# Patient Record
Sex: FEMALE | Race: WHITE | Employment: FULL TIME | URBAN - METROPOLITAN AREA
[De-identification: names, ages, dates, MRNs, and addresses within clinical notes are randomized per-mention and may not be internally consistent; named-entity substitution may affect disease eponyms.]

---

## 2017-03-09 ENCOUNTER — HOSPITAL ENCOUNTER (EMERGENCY)
Age: 57
Discharge: HOME | End: 2017-03-10
Attending: EMERGENCY MEDICINE
Payer: COMMERCIAL

## 2017-03-09 VITALS
HEART RATE: 89 BPM | SYSTOLIC BLOOD PRESSURE: 150 MMHG | OXYGEN SATURATION: 98 % | BODY MASS INDEX: 35.43 KG/M2 | RESPIRATION RATE: 16 BRPM | DIASTOLIC BLOOD PRESSURE: 96 MMHG | TEMPERATURE: 97.8 F | WEIGHT: 200 LBS

## 2017-03-09 DIAGNOSIS — S01.511A LIP LACERATION, INITIAL ENCOUNTER: Primary | ICD-10-CM
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
## 2017-03-09 PROCEDURE — 77030010507 HC ADH SKN DERMBND J&J -B

## 2017-03-09 PROCEDURE — 99282 EMERGENCY DEPT VISIT SF MDM: CPT

## 2017-03-09 PROCEDURE — 75810000293 HC SIMP/SUPERF WND  RPR

## 2017-03-10 NOTE — ED TRIAGE NOTES
Pt states she tripped over her dog and fell hitting her face on the nightstand. Denies LOC, small lac to area above upper lip. Bleeding controlled.

## 2017-03-10 NOTE — DISCHARGE INSTRUCTIONS
ArticleAlleyharEditorially Activation    Thank you for requesting access to [email or web address]. Please follow the instructions below to securely access and download your online medical record. [employer] allows you to send messages to your doctor, view your test results, renew your prescriptions, schedule appointments, and more. How Do I Sign Up? 1. In your internet browser, go to [email or web address]  2. Click on the First Time User? Click Here link in the Sign In box. You will be redirect to the New Member Sign Up page. 3. Enter your [employer] Access Code exactly as it appears below. You will not need to use this code after youve completed the sign-up process. If you do not sign up before the expiration date, you must request a new code. [email or web address] Access Code: Activation code not generated  Current [email or web address] Status: Active (This is the date your [employer] access code will )    4. Enter the last four digits of your Social Security Number (xxxx) and Date of Birth (mm/dd/yyyy) as indicated and click Submit. You will be taken to the next sign-up page. 5. Create a [employer] ID. This will be your [employer] login ID and cannot be changed, so think of one that is secure and easy to remember. 6. Create a [employer] password. You can change your password at any time. 7. Enter your Password Reset Question and Answer. This can be used at a later time if you forget your password. 8. Enter your e-mail address. You will receive e-mail notification when new information is available in [address] 9. Click Sign Up. You can now view and download portions of your medical record. 10. Click the Download Summary menu link to download a portable copy of your medical information. Additional Information    If you have questions, please visit the Frequently Asked Questions section of the [email or web address] website at [email or web address] Remember, [email or web address] is NOT to be used for urgent needs. For medical emergencies, dial 911.

## 2017-07-06 NOTE — ED PROVIDER NOTES
HPI Comments: Roslyn Sheridan is a 55year old female who presents with a laceration to the upper lip and maxillary face pain. Pt states she tripped over her dog and hit her face against the furniture in her house. No LOC. Patient is a 55 y.o. female presenting with skin problem. The history is provided by the patient. History limited by: no communication barrier. Skin Problem    This is a new problem. The current episode started 1 to 2 hours ago. The problem has not changed since onset. There has been no fever. Affected Location: upper lip. The pain is mild. The pain has been constant since onset. She has tried nothing for the symptoms. Past Medical History:   Diagnosis Date    Sarcoidosis ([facility] [address] 1990    Unspecified sleep apnea     Uterine polyp        Past Surgical History:   Procedure Laterality Date    HX HEENT      tonsillectomy    HX TONSILLECTOMY           Family History:   Problem Relation Age of Onset    Hypertension Mother     Cancer Father        Social History     Social History    Marital status:      Spouse name: N/A    Number of children: N/A    Years of education: N/A     Occupational History    Not on file. Social History Main Topics    Smoking status: Current Every Day Smoker     Packs/day: 0.25     Years: 30.00    Smokeless tobacco: Never Used    Alcohol use Yes    Drug use: No    Sexual activity: Yes     Partners: Male     Birth control/ protection: None     Other Topics Concern    Not on file     Social History Narrative         ALLERGIES: Vicodin [hydrocodone-acetaminophen]    Review of Systems   Constitutional: Negative. HENT: Negative. Eyes: Negative. Respiratory: Negative. Cardiovascular: Negative. Gastrointestinal: Negative. Endocrine: Negative. Genitourinary: Negative. Musculoskeletal: Negative. Skin: Positive for wound (upper lip and fce). Allergic/Immunologic: Negative. Neurological: Negative.     Hematological: Negative. Psychiatric/Behavioral: Negative. Vitals:    03/09/17 2244   BP: (!) 150/96   Pulse: 89   Resp: 16   Temp: 97.8 °F (36.6 °C)   SpO2: 98%   Weight: 90.7 kg (200 lb)            Physical Exam   Constitutional: She is oriented to person, place, and time. She appears well-developed and well-nourished. No distress. HENT:   Head: Normocephalic and atraumatic. The dentition is in tact without evidence of traumas. No loose  teeth. Eyes: EOM are normal. Pupils are equal, round, and reactive to light. Neck: Normal range of motion. Neck supple. Cardiovascular: Normal rate, regular rhythm and normal heart sounds. Pulmonary/Chest: No respiratory distress. She has no wheezes. She has no rales. Abdominal: Soft. Bowel sounds are normal. There is no tenderness. Genitourinary:   Genitourinary Comments: NE   Musculoskeletal: Normal range of motion. Neurological: She is alert and oriented to person, place, and time. Skin: Skin is warm and dry. Hinton Caitlin is a small one cm wound to the face just superior to the upper lip. There is also a wound that is small er to the buccal membrane surface of the upper inner ,ip. The wound is not through and through. The skin wound is very superficial.  No bleeding or FB. The inner wound is evern more superficial     Psychiatric: She has a normal mood and affect. Nursing note and vitals reviewed. MDM  Number of Diagnoses or Management Options  Elevated blood pressure:   Fall, initial encounter:   Lip laceration, initial encounter:   Diagnosis management comments: PROGRESS NOTE:  Pt was offered pain medicaion as well as a Td booster. States her Td was 7 or more years ago. She refused update. Salima Iyer NP  11:54 PM        ED Course       Wound Repair  Date/Time: 3/9/2017 11:51 PM  Performed by: Asiya Page RN. Supervising provider: Patel WOODSON  Preparation: skin prepped with Shur-Clens  Pre-procedure re-eval: Immediately prior to the procedure, the patient was reevaluated and found suitable for the planned procedure and any planned medications. Time out: Immediately prior to the procedure a time out was called to verify the correct patient, procedure, equipment, staff and marking as appropriate. .  Location: upper lip. Wound length:2.5 cm or less  Anesthesia method: none. Anesthesia:  Anesthesia method: none. Foreign bodies: no foreign bodies  Irrigation solution: tap water  Irrigation method: sponge. Debridement: none  Skin closure: glue  Approximation: close  Lip approximation: vermillion border well aligned  Patient tolerance: Patient tolerated the procedure well with no immediate complications  My total time at bedside, performing this procedure was 1-15 minutes. PROGRESS NOTE:  One or more blood pressure readings were noted elevated during the Pt's presentation in the emergency department this date. This abnormal reading has been cited in the Pt's diagnosis, and they have been encouraged to follow up with their primary care physician, or referred to a consultant for further evaluation and treatment. Maria Fernanda Gold NP  11:53 PM    Diagnosis:   1. Lip laceration, initial encounter    2. Fall, initial encounter    3. Elevated blood pressure          Disposition:   Discharged to Home. Follow-up Information     Follow up With Details Comments Jacques Zacarias., DO Call in the morning to arrange follow up. [address]   [address] [facility]  [phone]            Patient's Medications   Start Taking    No medications on file   Continue Taking    IBUPROFEN (MOTRIN) 600 MG TABLET    Take 1 tablet by mouth every six (6) hours as needed for Pain.    These Medications have changed    No medications on file   Stop Taking    No medications on file artificial rupture

## 2019-03-18 ENCOUNTER — TELEPHONE (OUTPATIENT)
Dept: FAMILY MEDICINE CLINIC | Age: 59
End: 2019-03-18

## 2019-03-18 DIAGNOSIS — Z00.00 ROUTINE GENERAL MEDICAL EXAMINATION AT A HEALTH CARE FACILITY: ICD-10-CM

## 2019-03-18 DIAGNOSIS — N95.0 POST-MENOPAUSAL BLEEDING: Primary | ICD-10-CM

## 2019-03-18 NOTE — TELEPHONE ENCOUNTER
Called Ms Brennen Nelson to let her know that Dr. Valeri Pereira has ordered labs for her to complete prior to her appointment time. Message left on vm for her to contact the office at her earliest convenience. Message given to her mother also to relay to her. Closing encounter.

## 2019-04-22 ENCOUNTER — HOSPITAL ENCOUNTER (OUTPATIENT)
Dept: LAB | Age: 59
Discharge: HOME | End: 2019-04-22
Attending: FAMILY MEDICINE
Payer: COMMERCIAL

## 2019-04-22 ENCOUNTER — HOSPITAL ENCOUNTER (OUTPATIENT)
Dept: MAMMOGRAPHY | Age: 59
Discharge: HOME | End: 2019-04-22
Attending: FAMILY MEDICINE
Payer: COMMERCIAL

## 2019-04-22 ENCOUNTER — HOSPITAL ENCOUNTER (OUTPATIENT)
Dept: GENERAL RADIOLOGY | Age: 59
Discharge: HOME | End: 2019-04-22
Attending: FAMILY MEDICINE
Payer: COMMERCIAL

## 2019-04-22 DIAGNOSIS — N95.0 POST-MENOPAUSAL BLEEDING: ICD-10-CM

## 2019-04-22 DIAGNOSIS — Z00.00 ROUTINE GENERAL MEDICAL EXAMINATION AT A HEALTH CARE FACILITY: ICD-10-CM

## 2019-04-22 LAB — SENTARA SPECIMEN COL,SENBCF: NORMAL

## 2019-04-22 PROCEDURE — 77063 BREAST TOMOSYNTHESIS BI: CPT

## 2019-04-22 PROCEDURE — 77080 DXA BONE DENSITY AXIAL: CPT

## 2019-04-22 PROCEDURE — 99001 SPECIMEN HANDLING PT-LAB: CPT

## 2019-04-23 LAB
A-G RATIO,AGRAT: 2.4 RATIO (ref 1.1–2.6)
ABSOLUTE LYMPHOCYTE COUNT, 10803: 2.1 K/UL (ref 1–4.8)
ALBUMIN SERPL-MCNC: 4.8 G/DL (ref 3.5–5)
ALP SERPL-CCNC: 56 U/L (ref 25–115)
ALT SERPL-CCNC: 21 U/L (ref 5–40)
ANION GAP SERPL CALC-SCNC: 18 MMOL/L
AST SERPL W P-5'-P-CCNC: 23 U/L (ref 10–37)
BACTERIA,BACTU: PRESENT
BASOPHILS # BLD: 0 K/UL (ref 0–0.2)
BASOPHILS NFR BLD: 1 % (ref 0–2)
BILIRUB SERPL-MCNC: 0.4 MG/DL (ref 0.2–1.2)
BILIRUB UR QL: NEGATIVE
BUN SERPL-MCNC: 15 MG/DL (ref 6–22)
CALCIUM SERPL-MCNC: 9.6 MG/DL (ref 8.4–10.5)
CHLORIDE SERPL-SCNC: 101 MMOL/L (ref 98–110)
CHOLEST SERPL-MCNC: 249 MG/DL (ref 110–200)
CO2 SERPL-SCNC: 24 MMOL/L (ref 20–32)
CREAT SERPL-MCNC: 0.9 MG/DL (ref 0.5–1.2)
EOSINOPHIL # BLD: 0.2 K/UL (ref 0–0.5)
EOSINOPHIL NFR BLD: 3 % (ref 0–6)
EPITHELIAL,EPSU: ABNORMAL /HPF (ref 0–2)
ERYTHROCYTE [DISTWIDTH] IN BLOOD BY AUTOMATED COUNT: 14.5 % (ref 10–15.5)
GFRAA, 66117: >60
GFRNA, 66118: >60
GLOBULIN,GLOB: 2 G/DL (ref 2–4)
GLUCOSE SERPL-MCNC: 101 MG/DL (ref 70–99)
GLUCOSE UR QL: NEGATIVE MG/DL
GRANULOCYTES,GRANS: 56 % (ref 40–75)
HCT VFR BLD AUTO: 45.8 % (ref 35.1–48)
HDLC SERPL-MCNC: 4.6 MG/DL (ref 0–5)
HDLC SERPL-MCNC: 54 MG/DL (ref 40–59)
HGB BLD-MCNC: 13.7 G/DL (ref 11.7–16)
HGB UR QL STRIP: ABNORMAL
KETONES UR QL STRIP.AUTO: NEGATIVE MG/DL
LDLC SERPL CALC-MCNC: 153 MG/DL (ref 50–99)
LEUKOCYTE ESTERASE: NEGATIVE
LYMPHOCYTES, LYMLT: 32 % (ref 20–45)
MCH RBC QN AUTO: 30 PG (ref 26–34)
MCHC RBC AUTO-ENTMCNC: 30 G/DL (ref 31–36)
MCV RBC AUTO: 102 FL (ref 80–95)
MONOCYTES # BLD: 0.5 K/UL (ref 0.1–1)
MONOCYTES NFR BLD: 8 % (ref 3–12)
NEUTROPHILS # BLD AUTO: 3.8 K/UL (ref 1.8–7.7)
NITRITE UR QL STRIP.AUTO: NEGATIVE
PH UR STRIP: 5 PH (ref 5–8)
PLATELET # BLD AUTO: 369 K/UL (ref 140–440)
PMV BLD AUTO: 10.5 FL (ref 9–13)
POTASSIUM SERPL-SCNC: 4.8 MMOL/L (ref 3.5–5.5)
PROT SERPL-MCNC: 6.8 G/DL (ref 6.4–8.3)
PROT UR QL STRIP: NEGATIVE MG/DL
RBC # BLD AUTO: 4.5 M/UL (ref 3.8–5.2)
RBC #/AREA URNS HPF: ABNORMAL /HPF
SODIUM SERPL-SCNC: 143 MMOL/L (ref 133–145)
SP GR UR: 1.02 (ref 1–1.03)
TRIGL SERPL-MCNC: 212 MG/DL (ref 40–149)
TSH SERPL DL<=0.005 MIU/L-ACNC: 9.5 MCU/ML (ref 0.27–4.2)
UROBILINOGEN UR STRIP-MCNC: <2 MG/DL
VLDLC SERPL CALC-MCNC: 42 MG/DL (ref 8–30)
WBC # BLD AUTO: 6.7 K/UL (ref 4–11)
WBC URNS QL MICRO: ABNORMAL /HPF (ref 0–2)

## 2019-04-24 ENCOUNTER — OFFICE VISIT (OUTPATIENT)
Dept: FAMILY MEDICINE CLINIC | Age: 59
End: 2019-04-24

## 2019-04-24 VITALS
TEMPERATURE: 98.5 F | HEIGHT: 63 IN | HEART RATE: 85 BPM | RESPIRATION RATE: 16 BRPM | BODY MASS INDEX: 38.02 KG/M2 | OXYGEN SATURATION: 95 % | SYSTOLIC BLOOD PRESSURE: 123 MMHG | DIASTOLIC BLOOD PRESSURE: 87 MMHG | WEIGHT: 214.6 LBS

## 2019-04-24 DIAGNOSIS — R79.89 ABNORMAL THYROID BLOOD TEST: ICD-10-CM

## 2019-04-24 DIAGNOSIS — Z00.00 ROUTINE GENERAL MEDICAL EXAMINATION AT A HEALTH CARE FACILITY: ICD-10-CM

## 2019-04-24 DIAGNOSIS — M85.80 OSTEOPENIA, UNSPECIFIED LOCATION: ICD-10-CM

## 2019-04-24 DIAGNOSIS — R03.0 ELEVATED BLOOD PRESSURE READING: Primary | ICD-10-CM

## 2019-04-24 PROBLEM — E66.01 SEVERE OBESITY (HCC): Status: ACTIVE | Noted: 2019-04-24

## 2019-04-24 NOTE — PROGRESS NOTES
Nolvia Johnson is a 63 y.o.  female and presents for a preventive health care visit   Subjective:  Health Maintenance History  Immunizations reviewed, refuses shingrex, flu, pneumo  indicated. Mammogram :utd nl , dexascan: utd nl ,pap smear : utd ,   colonoscopy: refuses ( will get cologuard ) , Eye exam: utd ,  Chest CT: na ,    HPI ;    Patient Active Problem List    Diagnosis Date Noted    Severe obesity ([facility] [address] 04/24/2019     Current Outpatient Medications   Medication Sig Dispense Refill    ibuprofen (MOTRIN) 600 mg tablet Take 1 tablet by mouth every six (6) hours as needed for Pain.  30 tablet 1     Allergies   Allergen Reactions    Vicodin [Hydrocodone-Acetaminophen] Nausea and Vomiting     Past Medical History:   Diagnosis Date    Sarcoidosis 1990    Unspecified sleep apnea     Uterine polyp      Past Surgical History:   Procedure Laterality Date    HX HEENT      tonsillectomy    HX TONSILLECTOMY       Family History   Problem Relation Age of Onset    Hypertension Mother     Cancer Father      Social History     Tobacco Use    Smoking status: Current Every Day Smoker     Packs/day: 0.25     Years: 30.00     Pack years: 7.50    Smokeless tobacco: Never Used   Substance Use Topics    Alcohol use: Yes       ROS       General: negative for - chills, fatigue, fever, weight change  Psych: negative for - anxiety, depression, irritability or mood swings  ENT: negative for - headaches, hearing change, nasal congestion, oral lesions, sneezing or sore throat  Heme/ Lymph: negative for - bleeding problems, bruising, pallor or swollen lymph nodes  Endo: negative for - hot flashes, polydipsia/polyuria or temperature intolerance  Resp: negative for - cough, shortness of breath or wheezing  CV: negative for - chest pain, edema or palpitations  GI: negative for - abdominal pain, change in bowel habits, constipation, diarrhea or nausea/vomiting  : negative for - dysuria, hematuria, incontinence, pelvic pain or vulvar/vaginal symptoms  MSK: negative for - joint pain, joint swelling or muscle pain  Neuro: negative for - confusion, headaches, seizures or weakness  Derm: negative for - dry skin, hair changes, rash or skin lesion changes      Objective:  Vitals:    04/24/19 0729   BP: 123/87   Pulse: 85   Resp: 16   Temp: 98.5 °F (36.9 °C)   TempSrc: Oral   SpO2: 95%   Weight: 214 lb 9.6 oz (97.3 kg)   Height: 5' 3\" (1.6 m)   PainSc:   2   PainLoc: Knee   LMP: 12/30/2012       alert, well appearing, and in no distress, oriented to person, place, and time and overweight  Mental status - alert, oriented to person, place, and time  Eyes - pupils equal and reactive, extraocular eye movements intact  Ears - bilateral TM's and external ear canals normal  Nose - normal and patent, no erythema, discharge or polyps  Mouth - mucous membranes moist, pharynx normal without lesions  Neck - supple, no significant adenopathy  Lymphatics - no palpable lymphadenopathy, no hepatosplenomegaly  Chest - clear to auscultation, no wheezes, rales or rhonchi, symmetric air entry  Heart - normal rate, regular rhythm, normal S1, S2, no murmurs, rubs, clicks or gallops  Abdomen - soft, nontender, nondistended, no masses or organomegaly  Breasts - breasts appear normal, no suspicious masses, no skin or nipple changes or axillary nodes  Back exam - full range of motion, no tenderness, palpable spasm or pain on motion  Neurological - alert, oriented, normal speech, no focal findings or movement disorder noted  Musculoskeletal - no joint tenderness, deformity or swelling  Extremities - peripheral pulses normal, no pedal edema, no clubbing or cyanosis  Skin - normal coloration and turgor, no rashes, no suspicious skin lesions noted        LABS  Component      Latest Ref Rng & Units 4/22/2019 4/22/2019 4/22/2019           7:00 AM  7:00 AM  7:00 AM   WBC      4.0 - 11.0 K/uL      RBC      3.80 - 5.20 M/uL      HGB      11.7 - 16.0 g/dL      HCT 35.1 - 48.0 %      MCV      80 - 95 fL      MCH      26 - 34 pg      MCHC      31 - 36 g/dL      RDW      10.0 - 15.5 %      PLATELET      342 - 742 K/uL      MPV      9.0 - 13.0 fL      NEUTROPHILS      40 - 75 %      Lymphocytes      20 - 45 %      MONOCYTES      3 - 12 %      EOSINOPHILS      0 - 6 %      BASOPHILS      0 - 2 %      ABS. NEUTROPHILS      1.8 - 7.7 K/uL      ABSOLUTE LYMPHOCYTE COUNT      1.0 - 4.8 K/uL      ABS. MONOCYTES      0.1 - 1.0 K/uL      ABS. EOSINOPHILS      0.0 - 0.5 K/uL      ABS. BASOPHILS      0.0 - 0.2 K/uL      Glucose      70 - 99 mg/dL  101 (H)    BUN      6 - 22 mg/dL  15    Creatinine      0.5 - 1.2 mg/dL  0.9    Sodium      133 - 145 mmol/L  143    Potassium      3.5 - 5.5 mmol/L  4.8    Chloride      98 - 110 mmol/L  101    CO2      20 - 32 mmol/L  24    AST      10 - 37 U/L  23    ALT (SGPT)      5 - 40 U/L  21    Alk.  phosphatase      25 - 115 U/L  56    Bilirubin, total      0.2 - 1.2 mg/dL  0.4    Calcium      8.4 - 10.5 mg/dL  9.6    Protein, total      6.4 - 8.3 g/dL  6.8    Albumin      3.5 - 5.0 g/dL  4.8    A-G Ratio      1.1 - 2.6 ratio  2.4    Globulin      2.0 - 4.0 g/dL  2.0    Anion gap      mmol/L  18.0    GFRAA      >60.0  >60.0    GFRNA      >60.0  >60.0    Specific Gravity      1.005 - 1.03      pH (UA)      5.0 - 8.0 pH      Protein      Negative, mg/dL      Glucose      Negative mg/dL      Ketone      Negative, mg/dL      Bilirubin      Negative      Blood      Negative      Nitrites      Negative      Leukocyte Esterase      Negative      Urobilinogen      <2.0 mg/dL      WBC      0 - 2 /hpf      RBC      Negative, /hpf      Bacteria      Negative      Epithelial cells      0 - 2 /hpf      Triglyceride      40 - 149 mg/dL   212 (H)   HDL Cholesterol      40 - 59 mg/dL   54   Cholesterol, total      110 - 200 mg/dL   249 (H)   CHOLESTEROL/HDL      0.0 - 5.0   4.6   LDL, calculated      50 - 99 mg/dL   153 (H)   VLDL, calculated      8 - 30 mg/dL   42 (H) TSH      0.27 - 4.20 mcU/mL 9.50 (H)       Component      Latest Ref Rng & Units 4/22/2019 4/22/2019           7:00 AM  7:00 AM   WBC      4.0 - 11.0 K/uL  6.7   RBC      3.80 - 5.20 M/uL  4.50   HGB      11.7 - 16.0 g/dL  13.7   HCT      35.1 - 48.0 %  45.8   MCV      80 - 95 fL  102 (H)   MCH      26 - 34 pg  30   MCHC      31 - 36 g/dL  30 (L)   RDW      10.0 - 15.5 %  14.5   PLATELET      539 - 956 K/uL  369   MPV      9.0 - 13.0 fL  10.5   NEUTROPHILS      40 - 75 %  56   Lymphocytes      20 - 45 %  32   MONOCYTES      3 - 12 %  8   EOSINOPHILS      0 - 6 %  3   BASOPHILS      0 - 2 %  1   ABS. NEUTROPHILS      1.8 - 7.7 K/uL  3.8   ABSOLUTE LYMPHOCYTE COUNT      1.0 - 4.8 K/uL  2.1   ABS. MONOCYTES      0.1 - 1.0 K/uL  0.5   ABS. EOSINOPHILS      0.0 - 0.5 K/uL  0.2   ABS. BASOPHILS      0.0 - 0.2 K/uL  0.0   Glucose      70 - 99 mg/dL     BUN      6 - 22 mg/dL     Creatinine      0.5 - 1.2 mg/dL     Sodium      133 - 145 mmol/L     Potassium      3.5 - 5.5 mmol/L     Chloride      98 - 110 mmol/L     CO2      20 - 32 mmol/L     AST      10 - 37 U/L     ALT (SGPT)      5 - 40 U/L     Alk.  phosphatase      25 - 115 U/L     Bilirubin, total      0.2 - 1.2 mg/dL     Calcium      8.4 - 10.5 mg/dL     Protein, total      6.4 - 8.3 g/dL     Albumin      3.5 - 5.0 g/dL     A-G Ratio      1.1 - 2.6 ratio     Globulin      2.0 - 4.0 g/dL     Anion gap      mmol/L     GFRAA      >60.0     GFRNA      >60.0     Specific Gravity      1.005 - 1.03 1.019    pH (UA)      5.0 - 8.0 pH 5.0    Protein      Negative, mg/dL Negative    Glucose      Negative mg/dL Negative    Ketone      Negative, mg/dL Negative    Bilirubin      Negative Negative    Blood      Negative Small (A)    Nitrites      Negative Negative    Leukocyte Esterase      Negative Negative    Urobilinogen      <2.0 mg/dL <2.0    WBC      0 - 2 /hpf 3-5 (A)    RBC      Negative, /hpf 3-5 (A)    Bacteria      Negative Present (A)    Epithelial cells      0 - 2 /hpf 5-10 (A)    Triglyceride      40 - 149 mg/dL     HDL Cholesterol      40 - 59 mg/dL     Cholesterol, total      110 - 200 mg/dL     CHOLESTEROL/HDL      0.0 - 5.0     LDL, calculated      50 - 99 mg/dL     VLDL, calculated      8 - 30 mg/dL     TSH      0.27 - 4.20 mcU/mL       TESTS  ekg  nsr      Assessment/Plan:    Health Maintenance up to date. Recommend f/u physical 1 year. Routine screening labs/tests recommended prior to next physical.              I have discussed the diagnosis with the patient and the intended plan as seen in the above orders. The patient has received an after-visit summary and questions were answered concerning future plans. I have discussed medication side effects and warnings with the patient as well. I have reviewed the plan of care with the patient, accepted their input and they are in agreement with the treatment goals. -------------------------------------------------------------------------------------------------------------------    Problem Assessment  and also with   Abn thyroid labs   Otherwise asymkptomatic but has + fm hx         HPI ; Additional Concerns:              Assessment/Plan:      Thyroid  Recheck today. If still abn then antibodies and us and f/u  Note cholesteorl discussed. She doesn't want statins at this time  Osteopenia check vit D supplement if low        Diagnoses and all orders for this visit:    1. Elevated blood pressure reading  -     AMB POC EKG ROUTINE W/ 12 LEADS, INTER & REP    2. Abnormal thyroid blood test  -     TSH 3RD GENERATION; Future  -     T4, FREE; Future  -     THYROGLOBULIN AB; Future  -     TSH RECEPTOR AB; Future  -     THYROID PEROXIDASE (TPO) AB; Future  -     US THYROID/PARATHYROID/SOFT TISS; Future    3. Routine general medical examination at a health care facility  -     COLOGUARD TEST (FECAL DNA COLORECTAL CANCER SCREENING)  -     RABIES AB, IGG; Future    4.  Osteopenia, unspecified location  -     VITAMIN D, 25 HYDROXY; Future          Lab review: orders written for new lab studies as appropriate; see orders

## 2019-04-24 NOTE — PROGRESS NOTES
Miguel Ángel Toscano is a 63 y.o. female presents today for complete physical exam. Patient reports of some right knee/foot/hip pain from time to time. Pt is in Room # 4        Learning Assessment (baseline): Completed  Depression Screening: Completed  Abuse screening: Completed    1. Have you been to the ER, urgent care clinic since your last visit? Hospitalized since your last visit? No    2. Have you seen or consulted any other health care providers outside of the [address] [place] [address] since your last visit? Include any pap smears or colon screening. No     Health Maintenance reviewed - .

## 2019-04-25 LAB
25(OH)D3 SERPL-MCNC: 18.2 NG/ML (ref 32–100)
T4 FREE SERPL-MCNC: 1.4 NG/DL (ref 0.9–1.8)
TSH SERPL DL<=0.005 MIU/L-ACNC: 6.42 MCU/ML (ref 0.27–4.2)

## 2019-04-26 LAB
Lab: 0.54 IU/L (ref 0–1.75)
THYROGLOBULIN AB,1830278241: <1 IU/ML (ref 0–0.9)
TPO AB,TMCAB: 11 IU/ML (ref 0–34)

## 2019-05-13 LAB — Lab: NORMAL IU/ML

## 2019-07-11 ENCOUNTER — PATIENT MESSAGE (OUTPATIENT)
Dept: FAMILY MEDICINE CLINIC | Age: 59
End: 2019-07-11

## 2019-07-25 NOTE — TELEPHONE ENCOUNTER
From: Evgeny De  Sent: 7/11/2019 2:15 PM EDT  Subject: Test Results Question    Still have not received any notification of my cologard test results done several weeks ago. Will someone else be able to release results since Dr Aguilar Munroe has retired?

## 2022-03-18 PROBLEM — E66.01 SEVERE OBESITY (HCC): Status: ACTIVE | Noted: 2019-04-24

## 2022-08-30 ENCOUNTER — TELEPHONE (OUTPATIENT)
Dept: FAMILY MEDICINE CLINIC | Age: 62
End: 2022-08-30

## 2022-08-30 NOTE — TELEPHONE ENCOUNTER
Approved by Dr. Kezia Salazar to schedule a NP appointment. Called and left a voice mail to call the office back to schedule.    ----- Message from Brent Bassett sent at 8/29/2022 11:32 AM EDT -----  Subject: Appointment Request    Reason for Call: New Patient/New to Provider Appointment needed: New   Patient Request Appointment    QUESTIONS    Reason for appointment request? Requested Provider unavailable - Ralph Smith     Additional Information for Provider? Patient would like to become   established with Dr. Kezia Salazar. Her , mother and father all seen him. They were all Dr. Asa Titus patients as was she and she didn't get a chance   to switch over.  She prefers Mondays and Fridays in the morning.   ---------------------------------------------------------------------------  --------------  Nicki Grass INFO  [phone]; OK to leave message on voicemail  ---------------------------------------------------------------------------  --------------  SCRIPT ANSWERS  COVID Screen: Faviola Stanford

## 2022-10-14 ENCOUNTER — OFFICE VISIT (OUTPATIENT)
Dept: FAMILY MEDICINE CLINIC | Age: 62
End: 2022-10-14
Payer: COMMERCIAL

## 2022-10-14 VITALS
HEART RATE: 88 BPM | RESPIRATION RATE: 14 BRPM | TEMPERATURE: 97.3 F | OXYGEN SATURATION: 95 % | BODY MASS INDEX: 38.66 KG/M2 | HEIGHT: 63 IN | WEIGHT: 218.2 LBS | DIASTOLIC BLOOD PRESSURE: 88 MMHG | SYSTOLIC BLOOD PRESSURE: 132 MMHG

## 2022-10-14 DIAGNOSIS — N39.41 URGE INCONTINENCE: ICD-10-CM

## 2022-10-14 DIAGNOSIS — E66.01 SEVERE OBESITY (HCC): ICD-10-CM

## 2022-10-14 DIAGNOSIS — E55.9 VITAMIN D DEFICIENCY: ICD-10-CM

## 2022-10-14 DIAGNOSIS — Z12.11 COLON CANCER SCREENING: ICD-10-CM

## 2022-10-14 DIAGNOSIS — Z86.16 HISTORY OF COVID-19: ICD-10-CM

## 2022-10-14 DIAGNOSIS — E78.00 HYPERCHOLESTEROLEMIA: Primary | ICD-10-CM

## 2022-10-14 PROCEDURE — 99204 OFFICE O/P NEW MOD 45 MIN: CPT | Performed by: FAMILY MEDICINE

## 2022-10-14 RX ORDER — ROSUVASTATIN CALCIUM 20 MG/1
TABLET, COATED ORAL
COMMUNITY
Start: 2021-04-02

## 2022-10-14 NOTE — PROGRESS NOTES
Charlotte Garibay is a 65 y.o.  female and presents with    Chief Complaint   Patient presents with    Cholesterol Problem       Subjective:  Dyslipidemia  Patient presents for evaluation of lipids. Compliance with treatment thus far has been good. A repeat fasting lipid profile was not done. The patient does not use medications that may worsen dyslipidemias (corticosteroids, progestins, anabolic steroids, diuretics, beta-blockers, amiodarone, cyclosporine, olanzapine). The patient exercises intermittently. The patient is not known to have coexisting coronary artery disease. Cardiac Risk Factors  Age > 45-male, > 55-female:  YES  +1   Smoking:   YES  +1   Sig. family hx of CHD*:  NO   Hypertension:   NO   Diabetes:   NO   HDL < 35:   NO   HDL > 59:   NO   Total: 2   *- Sig. family h/o CHD per NCEP = MI or sudden death at <56yo in   father or other 4st-degree male relative, or <66yo in mother or   other 1st-degree female relative    ROS   General ROS: negative for - chills, fatigue, or fever; + weight gain  Psychological ROS: negative for - anxiety or depression  Ophthalmic ROS: positive for - uses glasses  ENT ROS: negative for - headaches, nasal congestion, or sore throat  Endocrine ROS: negative for - polydipsia/polyuria or temperature intolerance  Respiratory ROS: no cough, shortness of breath, or wheezing  Cardiovascular ROS: no chest pain or dyspnea on exertion  Gastrointestinal ROS: no abdominal pain, change in bowel habits, or black or bloody stools  Genito-Urinary ROS: positive for - urge incontinence  Musculoskeletal ROS: positive for - joint pain  Neurological ROS: negative for - numbness/tingling or weakness  Dermatological ROS: positive for - rash associated with poison ivy    All other systems reviewed and are negative.       Objective:  Vitals:    10/14/22 0913   BP: 132/88   Pulse: 88   Resp: 14   Temp: 97.3 °F (36.3 °C)   TempSrc: Temporal   SpO2: 95%   Weight: 218 lb 3.2 oz (99 kg) Height: 5' 3\" (1.6 m)   PainSc:   0 - No pain   LMP: 12/30/2012       alert, well appearing, and in no distress, oriented to person, place, and time, and obese  Mental status - normal mood, behavior, speech, dress, motor activity, and thought processes  Chest - clear to auscultation, no wheezes, rales or rhonchi, symmetric air entry  Heart - normal rate, regular rhythm, normal S1, S2, no murmurs, rubs, clicks or gallops  Neurological - cranial nerves II through XII intact, Romberg sign negative, normal gait and station    LABS     TESTS      Assessment/Plan:    1. Hypercholesterolemia  Continue statin therapy; assess for efficacy of therapy  - LIPID PANEL; Future    2. Colon cancer screening    - COLOGUARD TEST (FECAL DNA COLORECTAL CANCER SCREENING)    3. Severe obesity ([address]  I have reviewed/discussed the above normal BMI with the patient. I have recommended the following interventions: dietary management education, guidance, and counseling and encourage exercise . Bob Siegel 4. Urge incontinence  Followed by Dr. Randy Yanez urogynecologist; no benefit from medication; no treatment at this time except home kegel exercises    5. Vitamin D deficiency  Needs updated laboratory test to assess need for supplementation    Lab review: orders written for new lab studies as appropriate; see orders      I have discussed the diagnosis with the patient and the intended plan as seen in the above orders. The patient has received an after-visit summary and questions were answered concerning future plans. I have discussed medication side effects and warnings with the patient as well. I have reviewed the plan of care with the patient, accepted their input and they are in agreement with the treatment goals.

## 2022-10-27 LAB
25(OH)D3 SERPL-MCNC: 28.5 NG/ML (ref 32–100)
A-G RATIO,AGRAT: 2.4 RATIO (ref 1.1–2.6)
ALBUMIN SERPL-MCNC: 4.7 G/DL (ref 3.5–5)
ALP SERPL-CCNC: 57 U/L (ref 40–120)
ALT SERPL-CCNC: 25 U/L (ref 5–40)
ANION GAP SERPL CALC-SCNC: 13 MMOL/L (ref 3–15)
AST SERPL W P-5'-P-CCNC: 21 U/L (ref 10–37)
AVG GLU, 10930: 123 MG/DL (ref 91–123)
BILIRUB SERPL-MCNC: 0.5 MG/DL (ref 0.2–1.2)
BUN SERPL-MCNC: 18 MG/DL (ref 6–22)
CALCIUM SERPL-MCNC: 9.7 MG/DL (ref 8.4–10.5)
CHLORIDE SERPL-SCNC: 104 MMOL/L (ref 98–110)
CHOLEST SERPL-MCNC: 171 MG/DL (ref 110–200)
CO2 SERPL-SCNC: 26 MMOL/L (ref 20–32)
COLOGUARD TEST, EXTERNAL: NEGATIVE
COLOGUARD TEST, EXTERNAL: NEGATIVE
CREAT SERPL-MCNC: 0.7 MG/DL (ref 0.8–1.4)
GLOBULIN,GLOB: 2 G/DL (ref 2–4)
GLOMERULAR FILTRATION RATE: >60 ML/MIN/1.73 SQ.M.
GLUCOSE SERPL-MCNC: 97 MG/DL (ref 70–99)
HBA1C MFR BLD HPLC: 5.9 % (ref 4.8–5.6)
HDLC SERPL-MCNC: 2.6 MG/DL (ref 0–5)
HDLC SERPL-MCNC: 67 MG/DL
LDL/HDL RATIO,LDHD: 1.1
LDLC SERPL CALC-MCNC: 75 MG/DL (ref 50–99)
NON-HDL CHOLESTEROL, 011976: 104 MG/DL
POTASSIUM SERPL-SCNC: 4.8 MMOL/L (ref 3.5–5.5)
PROT SERPL-MCNC: 6.7 G/DL (ref 6.2–8.1)
SODIUM SERPL-SCNC: 143 MMOL/L (ref 133–145)
TRIGL SERPL-MCNC: 143 MG/DL (ref 40–149)
VLDLC SERPL CALC-MCNC: 29 MG/DL (ref 8–30)

## 2022-11-18 NOTE — TELEPHONE ENCOUNTER
This patient contacted the office for the following prescriptions to be refilled:    Medication requested :   Requested Prescriptions     Pending Prescriptions Disp Refills    rosuvastatin (Crestor) 20 mg tablet        PCP: Jovany Ortega MD  LOV: 10/26/2022  NOV DMA: Visit date not found  FUTURE APPT: No future appointments. Thank you.

## 2022-11-21 RX ORDER — ROSUVASTATIN CALCIUM 20 MG/1
TABLET, COATED ORAL
Qty: 90 TABLET | Refills: 3 | Status: SHIPPED | OUTPATIENT
Start: 2022-11-21

## 2024-03-06 ENCOUNTER — OFFICE VISIT (OUTPATIENT)
Facility: CLINIC | Age: 64
End: 2024-03-06

## 2024-03-06 VITALS
OXYGEN SATURATION: 96 % | HEART RATE: 90 BPM | WEIGHT: 229.2 LBS | TEMPERATURE: 97.2 F | HEIGHT: 63 IN | RESPIRATION RATE: 20 BRPM | DIASTOLIC BLOOD PRESSURE: 85 MMHG | BODY MASS INDEX: 40.61 KG/M2 | SYSTOLIC BLOOD PRESSURE: 126 MMHG

## 2024-03-06 DIAGNOSIS — Z13.1 DIABETES MELLITUS SCREENING: ICD-10-CM

## 2024-03-06 DIAGNOSIS — E78.00 HYPERCHOLESTEREMIA: ICD-10-CM

## 2024-03-06 DIAGNOSIS — Z00.00 ANNUAL PHYSICAL EXAM: Primary | ICD-10-CM

## 2024-03-06 DIAGNOSIS — E66.01 MORBID (SEVERE) OBESITY DUE TO EXCESS CALORIES (HCC): ICD-10-CM

## 2024-03-06 RX ORDER — ROSUVASTATIN CALCIUM 20 MG/1
20 TABLET, COATED ORAL DAILY
Qty: 90 TABLET | Refills: 3 | Status: SHIPPED | OUTPATIENT
Start: 2024-03-06

## 2024-03-06 SDOH — ECONOMIC STABILITY: HOUSING INSECURITY
IN THE LAST 12 MONTHS, WAS THERE A TIME WHEN YOU DID NOT HAVE A STEADY PLACE TO SLEEP OR SLEPT IN A SHELTER (INCLUDING NOW)?: NO

## 2024-03-06 SDOH — ECONOMIC STABILITY: INCOME INSECURITY: HOW HARD IS IT FOR YOU TO PAY FOR THE VERY BASICS LIKE FOOD, HOUSING, MEDICAL CARE, AND HEATING?: NOT HARD AT ALL

## 2024-03-06 SDOH — ECONOMIC STABILITY: FOOD INSECURITY: WITHIN THE PAST 12 MONTHS, THE FOOD YOU BOUGHT JUST DIDN'T LAST AND YOU DIDN'T HAVE MONEY TO GET MORE.: NEVER TRUE

## 2024-03-06 SDOH — ECONOMIC STABILITY: FOOD INSECURITY: WITHIN THE PAST 12 MONTHS, YOU WORRIED THAT YOUR FOOD WOULD RUN OUT BEFORE YOU GOT MONEY TO BUY MORE.: NEVER TRUE

## 2024-03-06 ASSESSMENT — PATIENT HEALTH QUESTIONNAIRE - PHQ9
SUM OF ALL RESPONSES TO PHQ QUESTIONS 1-9: 0
2. FEELING DOWN, DEPRESSED OR HOPELESS: 0
SUM OF ALL RESPONSES TO PHQ QUESTIONS 1-9: 0
SUM OF ALL RESPONSES TO PHQ QUESTIONS 1-9: 0
1. LITTLE INTEREST OR PLEASURE IN DOING THINGS: 0
SUM OF ALL RESPONSES TO PHQ QUESTIONS 1-9: 0
SUM OF ALL RESPONSES TO PHQ9 QUESTIONS 1 & 2: 0

## 2024-03-06 NOTE — PROGRESS NOTES
Avril Bueno is a 63 y.o. presents today for   Chief Complaint   Patient presents with    Medication Refill       Is someone accompanying this pt?  NO    Is the patient using any DME equipment during OV? NO    Depression Screening:       3/6/2024     9:13 AM   PHQ-9 Questionaire   Little interest or pleasure in doing things 0   Feeling down, depressed, or hopeless 0   PHQ-9 Total Score 0       Abuse Screening:       No data to display                Learning Assessment:  No question data found.    Fall Risk:       No data to display                    Coordination of Care:   1. \"Have you been to the ER, urgent care clinic since your last visit?  Hospitalized since your last visit?\" NO    2. \"Have you seen or consulted any other health care providers outside of the [facility] since your last visit?\" NO    3. For patients aged 45-75: Has the patient had a colonoscopy / FIT/ Cologuard? YES    If the patient is female:    4. For patients aged 40-74: Has the patient had a mammogram within the past 2 years? NO    5. For patients aged 21-65: Has the patient had a pap smear? NO    Health Maintenance: reviewed and discussed and ordered per Provider.    Health Maintenance Due   Topic Date Due    COVID-19 Vaccine (1) Never done    Depression Screen  Never done    HIV screen  Never done    Shingles vaccine (1 of 2) Never done    Cervical cancer screen  11/14/2015    Respiratory Syncytial Virus (RSV) Pregnant or age 60 yrs+ (1 - 1-dose 60+ series) Never done    DTaP/Tdap/Td vaccine (2 - Td or Tdap) 04/01/2022    Flu vaccine (1) Never done    A1C test (Diabetic or Prediabetic)  10/26/2023    Lipids  10/26/2023    Breast cancer screen  02/11/2024        Rula Moses LPN  [facility]  Phone: [phone]  Fax: [phone]

## 2024-03-06 NOTE — PROGRESS NOTES
Avril Bueno is a 63 y.o.  female and presents with    Chief Complaint   Patient presents with    Medication Refill    Annual Exam    Cholesterol Problem       Subjective:  Patient is here for routine physical exam. labs and to renew rosuvastatin. She is taking medication and denies side effects.    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       ROS   General: no fevers or chills. No nausea or dizziness  CV: no chest pain, palpitations, sob  GI: no abdominal pian, no constipation or diarrhea  : + urge incontinence, discussed with OB/gyn, does not want treatment    All other systems reviewed and are negative.      Objective:  Vitals:    03/06/24 0919   BP: 126/85   Pulse: 90   Resp: 20   Temp: 97.2 °F (36.2 °C)   SpO2: 96%         alert, well appearing, and in no distress  Eyes - pupils equal and reactive, extraocular eye movements intact  Ears - bilateral TM's and external ear canals normal  Mouth - mucous membranes moist, pharynx normal without lesions  Neck - supple, no significant adenopathy  Chest - clear to auscultation, no wheezes, rales or rhonchi, symmetric air entry  Heart - normal rate, regular rhythm, normal S1, S2, no murmurs, rubs, clicks or gallops  Abdomen - soft, nontender, nondistended, no masses or organomegaly  Extremities - peripheral pulses normal, no pedal edema, no clubbing or cyanosis      LABS   Future - rabies titer, lipid panel, CMP, A1C  TESTS      Assessment/Plan:    1. Annual physical exam  Health Maintenance  - Cologuard 2022, next due 10/27/2025  - mammo due in feb   - pap/hpv due 2/2025  - vaccines: does not want vaccines     2. Morbid (severe) obesity due to excess calories (HCC)  Encourage healthy diet and increased exercise  - CBC with Auto Differential; Future    3. Hypercholesteremia  Continue statin therapy; assess efficacy  - rosuvastatin (CRESTOR) 20 MG tablet; Take 1 tablet by mouth

## 2024-03-07 LAB
BASOPHILS # BLD AUTO: 0.1 X10E3/UL (ref 0–0.2)
BASOPHILS NFR BLD AUTO: 1 %
CHOLEST SERPL-MCNC: 171 MG/DL (ref 100–199)
EOSINOPHIL # BLD AUTO: 0.2 X10E3/UL (ref 0–0.4)
EOSINOPHIL NFR BLD AUTO: 2 %
ERYTHROCYTE [DISTWIDTH] IN BLOOD BY AUTOMATED COUNT: 13.5 % (ref 11.7–15.4)
HBA1C MFR BLD: 6.1 % (ref 4.8–5.6)
HCT VFR BLD AUTO: 42.4 % (ref 34–46.6)
HDLC SERPL-MCNC: 53 MG/DL
HGB BLD-MCNC: 13.9 G/DL (ref 11.1–15.9)
IMM GRANULOCYTES # BLD AUTO: 0 X10E3/UL (ref 0–0.1)
IMM GRANULOCYTES NFR BLD AUTO: 0 %
LDLC SERPL CALC-MCNC: 94 MG/DL (ref 0–99)
LYMPHOCYTES # BLD AUTO: 1.8 X10E3/UL (ref 0.7–3.1)
LYMPHOCYTES NFR BLD AUTO: 27 %
MCH RBC QN AUTO: 30.3 PG (ref 26.6–33)
MCHC RBC AUTO-ENTMCNC: 32.8 G/DL (ref 31.5–35.7)
MCV RBC AUTO: 93 FL (ref 79–97)
MONOCYTES # BLD AUTO: 0.7 X10E3/UL (ref 0.1–0.9)
MONOCYTES NFR BLD AUTO: 10 %
NEUTROPHILS # BLD AUTO: 3.8 X10E3/UL (ref 1.4–7)
NEUTROPHILS NFR BLD AUTO: 60 %
PLATELET # BLD AUTO: 385 X10E3/UL (ref 150–450)
RBC # BLD AUTO: 4.58 X10E6/UL (ref 3.77–5.28)
TRIGL SERPL-MCNC: 135 MG/DL (ref 0–149)
VLDLC SERPL CALC-MCNC: 24 MG/DL (ref 5–40)
WBC # BLD AUTO: 6.5 X10E3/UL (ref 3.4–10.8)

## 2024-03-21 LAB — RABV NAB SER NT-ACNC: NORMAL IU/ML

## 2024-06-07 ENCOUNTER — OFFICE VISIT (OUTPATIENT)
Facility: CLINIC | Age: 64
End: 2024-06-07
Payer: COMMERCIAL

## 2024-06-07 VITALS
OXYGEN SATURATION: 96 % | BODY MASS INDEX: 40.15 KG/M2 | HEIGHT: 63 IN | WEIGHT: 226.6 LBS | HEART RATE: 94 BPM | SYSTOLIC BLOOD PRESSURE: 121 MMHG | RESPIRATION RATE: 17 BRPM | TEMPERATURE: 98.1 F | DIASTOLIC BLOOD PRESSURE: 79 MMHG

## 2024-06-07 DIAGNOSIS — N63.24 MASS OF LOWER INNER QUADRANT OF LEFT BREAST: Primary | ICD-10-CM

## 2024-06-07 PROCEDURE — 99214 OFFICE O/P EST MOD 30 MIN: CPT | Performed by: FAMILY MEDICINE

## 2024-06-07 SDOH — ECONOMIC STABILITY: FOOD INSECURITY: WITHIN THE PAST 12 MONTHS, THE FOOD YOU BOUGHT JUST DIDN'T LAST AND YOU DIDN'T HAVE MONEY TO GET MORE.: NEVER TRUE

## 2024-06-07 SDOH — ECONOMIC STABILITY: INCOME INSECURITY: HOW HARD IS IT FOR YOU TO PAY FOR THE VERY BASICS LIKE FOOD, HOUSING, MEDICAL CARE, AND HEATING?: NOT VERY HARD

## 2024-06-07 SDOH — ECONOMIC STABILITY: FOOD INSECURITY: WITHIN THE PAST 12 MONTHS, YOU WORRIED THAT YOUR FOOD WOULD RUN OUT BEFORE YOU GOT MONEY TO BUY MORE.: NEVER TRUE

## 2024-06-07 ASSESSMENT — PATIENT HEALTH QUESTIONNAIRE - PHQ9
SUM OF ALL RESPONSES TO PHQ QUESTIONS 1-9: 0
SUM OF ALL RESPONSES TO PHQ QUESTIONS 1-9: 0
SUM OF ALL RESPONSES TO PHQ9 QUESTIONS 1 & 2: 0
SUM OF ALL RESPONSES TO PHQ QUESTIONS 1-9: 0
1. LITTLE INTEREST OR PLEASURE IN DOING THINGS: NOT AT ALL
SUM OF ALL RESPONSES TO PHQ QUESTIONS 1-9: 0
2. FEELING DOWN, DEPRESSED OR HOPELESS: NOT AT ALL

## 2024-06-07 ASSESSMENT — ANXIETY QUESTIONNAIRES
5. BEING SO RESTLESS THAT IT IS HARD TO SIT STILL: NOT AT ALL
6. BECOMING EASILY ANNOYED OR IRRITABLE: NOT AT ALL
IF YOU CHECKED OFF ANY PROBLEMS ON THIS QUESTIONNAIRE, HOW DIFFICULT HAVE THESE PROBLEMS MADE IT FOR YOU TO DO YOUR WORK, TAKE CARE OF THINGS AT HOME, OR GET ALONG WITH OTHER PEOPLE: NOT DIFFICULT AT ALL
GAD7 TOTAL SCORE: 0
3. WORRYING TOO MUCH ABOUT DIFFERENT THINGS: NOT AT ALL
7. FEELING AFRAID AS IF SOMETHING AWFUL MIGHT HAPPEN: NOT AT ALL
4. TROUBLE RELAXING: NOT AT ALL
1. FEELING NERVOUS, ANXIOUS, OR ON EDGE: NOT AT ALL
2. NOT BEING ABLE TO STOP OR CONTROL WORRYING: NOT AT ALL

## 2024-06-07 NOTE — PROGRESS NOTES
Avril Bueno is a 63 y.o. presents today for   Chief Complaint   Patient presents with    Breast Mass     Is someone accompanying this pt? no    Is the patient using any DME equipment during OV? no  There were no vitals filed for this visit.    Depression Screenin/7/2024    11:48 AM 3/6/2024     9:13 AM   PHQ-9 Questionaire   Little interest or pleasure in doing things 0 0   Feeling down, depressed, or hopeless 0 0   PHQ-9 Total Score 0 0        Abuse Screening:       No data to display                 Learning Assessment Screening:   No question data found.     Fall Risk Screening:        No data to display                    Health Maintenance: reviewed and discussed and ordered per Provider.    Health Maintenance Due   Topic Date Due    HIV screen  Never done    Shingles vaccine (1 of 2) Never done    Cervical cancer screen  2015    Respiratory Syncytial Virus (RSV) Pregnant or age 60 yrs+ (1 - 1-dose 60+ series) Never done    DTaP/Tdap/Td vaccine (2 - Td or Tdap) 2022    COVID-19 Vaccine (3 - -24 season) 2023    Breast cancer screen  2024         Coordination of Care:   1. \"Have you been to the ER, urgent care clinic since your last visit?  Hospitalized since your last visit?\" no    2. \"Have you seen or consulted any other health care providers outside of the [facility] since your last visit?\" no    3. For patients aged 45-75: Has the patient had a colonoscopy / FIT/ Cologuard?Yes - no Care Gap present  If the patient is female:    4. For patients aged 40-74: Has the patient had a mammogram within the past 2 years? No    5. For patients aged 21-65: Has the patient had a pap smear? NA - based on age or sex    Advanced Directive:  1. Do you have an Advanced Directive? No     2. Would you like information on Advanced Directives? Yes

## 2024-06-07 NOTE — PROGRESS NOTES
Avril Bueno is a 63 y.o.  female and presents with    Chief Complaint   Patient presents with    Breast Mass           Subjective:  Left breast mass noted on self exam; she noted this a week ago; she is concerned; she denies skin changes; no fever or chills or weight changes.    ROS     All other systems reviewed and are negative.      Objective:  Vitals:    06/07/24 1153   BP: 121/79   Pulse: 94   Resp: 17   Temp: 98.1 °F (36.7 °C)   SpO2: 96%         alert, well appearing, and in no distress and oriented to person, place, and time  Mental status - normal mood, behavior, speech, dress, motor activity, and thought processes  Breasts - abnormal mass palpable left breast pea size without ttp lower inner quadrant  Skin - no dimpling; no erythema  LABS     TESTS      Assessment/Plan:    1. Mass of lower inner quadrant of left breast  Imaging ordered  - [facility] DIGITAL DIAGNOSTIC W OR WO CAD BILATERAL; Future  - US BREAST COMPLETE LEFT; Future       Lab review: orders written for new lab studies as appropriate; see orders      I have discussed the diagnosis with the patient and the intended plan as seen in the above orders.  The patient has received an after-visit summary and questions were answered concerning future plans.  I have discussed medication side effects and warnings with the patient as well. I have reviewed the plan of care with the patient, accepted their input and they are in agreement with the treatment goals.

## 2024-06-10 ENCOUNTER — TELEPHONE (OUTPATIENT)
Facility: CLINIC | Age: 64
End: 2024-06-10

## 2024-06-10 DIAGNOSIS — N63.24 MASS OF LOWER INNER QUADRANT OF LEFT BREAST: Primary | ICD-10-CM

## 2024-06-10 NOTE — TELEPHONE ENCOUNTER
Received t/c from [facility]. Spoke w/ Flora KAUFMAN calling from # [phone]. Stated that US of breast needs to be changed to limited instead of complete. Stated she will be able to see new order in Epic once completed. Advised her that this nurse will let MD know.

## 2024-06-18 ENCOUNTER — HOSPITAL ENCOUNTER (OUTPATIENT)
Facility: HOSPITAL | Age: 64
Discharge: HOME | End: 2024-06-21
Attending: FAMILY MEDICINE
Payer: COMMERCIAL

## 2024-06-18 ENCOUNTER — HOSPITAL ENCOUNTER (OUTPATIENT)
Dept: WOMENS IMAGING | Facility: HOSPITAL | Age: 64
Discharge: HOME | End: 2024-06-21
Attending: FAMILY MEDICINE
Payer: COMMERCIAL

## 2024-06-18 DIAGNOSIS — N63.24 MASS OF LOWER INNER QUADRANT OF LEFT BREAST: ICD-10-CM

## 2024-06-18 PROCEDURE — 76642 ULTRASOUND BREAST LIMITED: CPT

## 2024-06-18 PROCEDURE — G0279 TOMOSYNTHESIS, MAMMO: HCPCS

## 2024-06-27 ENCOUNTER — HOSPITAL ENCOUNTER (OUTPATIENT)
Facility: HOSPITAL | Age: 64
End: 2024-06-27
Attending: FAMILY MEDICINE
Payer: COMMERCIAL

## 2024-06-27 ENCOUNTER — HOSPITAL ENCOUNTER (OUTPATIENT)
Dept: WOMENS IMAGING | Facility: HOSPITAL | Age: 64
End: 2024-06-27
Attending: FAMILY MEDICINE
Payer: COMMERCIAL

## 2024-06-27 DIAGNOSIS — R92.8 ABNORMAL MAMMOGRAM: ICD-10-CM

## 2024-06-27 DIAGNOSIS — R92.8 ABNORMAL FINDING ON BREAST IMAGING: ICD-10-CM

## 2024-06-27 PROCEDURE — 19083 BX BREAST 1ST LESION US IMAG: CPT

## 2024-06-27 PROCEDURE — 2500000003 HC RX 250 WO HCPCS: Performed by: RADIOLOGY

## 2024-06-27 PROCEDURE — 88360 TUMOR IMMUNOHISTOCHEM/MANUAL: CPT

## 2024-06-27 PROCEDURE — 88305 TISSUE EXAM BY PATHOLOGIST: CPT

## 2024-06-27 PROCEDURE — G0279 TOMOSYNTHESIS, MAMMO: HCPCS

## 2024-06-27 RX ORDER — LIDOCAINE HYDROCHLORIDE AND EPINEPHRINE 10; 10 MG/ML; UG/ML
50 INJECTION, SOLUTION INFILTRATION; PERINEURAL ONCE
Status: COMPLETED | OUTPATIENT
Start: 2024-06-27 | End: 2024-06-27

## 2024-06-27 RX ORDER — LIDOCAINE HYDROCHLORIDE 10 MG/ML
20 INJECTION, SOLUTION EPIDURAL; INFILTRATION; INTRACAUDAL; PERINEURAL ONCE
Status: COMPLETED | OUTPATIENT
Start: 2024-06-27 | End: 2024-06-27

## 2024-06-27 RX ADMIN — LIDOCAINE HYDROCHLORIDE,EPINEPHRINE BITARTRATE 20 ML: 10; .01 INJECTION, SOLUTION INFILTRATION; PERINEURAL at 08:27

## 2024-06-27 RX ADMIN — LIDOCAINE HYDROCHLORIDE 5 ML: 10 INJECTION, SOLUTION EPIDURAL; INFILTRATION; INTRACAUDAL; PERINEURAL at 08:24

## 2024-06-27 NOTE — PROGRESS NOTES
Patient arrived for left breast Ultrasound biopsy with clip placement. Pt arrived Mammography ambulatory, alert and oriented. Pt tolerated biopsy well without complaint. Specimens obtained,  imaged and placed in labeled specimen containers for pathology. Direct pressure applied to incision site, bleeding controlled, steri- strips applied and covered with gauze prior to post mammogram films for confirmation of clip placement.  Clean dry gauze and Ice pack applied and bra on.   Reviewed post procedure discharge instructions with patient. Patient verbalizes understanding and written copy given to patient. Patient D/C'd 0900am/pdd

## 2024-07-08 DIAGNOSIS — D05.12 DUCTAL CARCINOMA IN SITU (DCIS) OF LEFT BREAST: Primary | ICD-10-CM

## 2024-07-23 DIAGNOSIS — R06.83 SNORING: ICD-10-CM

## 2024-07-23 DIAGNOSIS — F40.240 CLAUSTROPHOBIA: Primary | ICD-10-CM

## 2024-07-23 RX ORDER — ALPRAZOLAM 0.5 MG/1
0.5 TABLET ORAL 3 TIMES DAILY PRN
Qty: 3 TABLET | Refills: 0 | Status: SHIPPED | OUTPATIENT
Start: 2024-07-23 | End: 2024-07-30

## 2024-08-13 ENCOUNTER — CLINICAL DOCUMENTATION (OUTPATIENT)
Facility: CLINIC | Age: 64
End: 2024-08-13

## 2024-09-06 ENCOUNTER — TELEPHONE (OUTPATIENT)
Facility: CLINIC | Age: 64
End: 2024-09-06

## 2024-09-06 NOTE — TELEPHONE ENCOUNTER
Tried to call patient but her voicemail came on ,so I was not able to leave a message. To let her know that they are working on her order   2 = assistive person

## 2024-09-11 ENCOUNTER — TELEPHONE (OUTPATIENT)
Facility: CLINIC | Age: 64
End: 2024-09-11

## 2024-09-26 DIAGNOSIS — E78.00 HYPERCHOLESTEREMIA: ICD-10-CM

## 2024-09-27 RX ORDER — ROSUVASTATIN CALCIUM 20 MG/1
20 TABLET, COATED ORAL DAILY
Qty: 90 TABLET | Refills: 3 | Status: SHIPPED | OUTPATIENT
Start: 2024-09-27

## 2024-11-15 ENCOUNTER — OFFICE VISIT (OUTPATIENT)
Facility: CLINIC | Age: 64
End: 2024-11-15
Payer: COMMERCIAL

## 2024-11-15 VITALS
BODY MASS INDEX: 40.22 KG/M2 | SYSTOLIC BLOOD PRESSURE: 135 MMHG | WEIGHT: 227 LBS | OXYGEN SATURATION: 91 % | HEIGHT: 63 IN | TEMPERATURE: 97.8 F | DIASTOLIC BLOOD PRESSURE: 85 MMHG | HEART RATE: 95 BPM

## 2024-11-15 DIAGNOSIS — M25.562 ACUTE PAIN OF LEFT KNEE: Primary | ICD-10-CM

## 2024-11-15 DIAGNOSIS — C50.912 PRIMARY CANCER OF LEFT BREAST WITH STAGE 2 NODAL METASTASIS PER AMERICAN JOINT COMMITTEE ON CANCER 7TH EDITION (N2) (HCC): ICD-10-CM

## 2024-11-15 DIAGNOSIS — F51.04 PSYCHOPHYSIOLOGIC INSOMNIA: ICD-10-CM

## 2024-11-15 DIAGNOSIS — C77.9 PRIMARY CANCER OF LEFT BREAST WITH STAGE 2 NODAL METASTASIS PER AMERICAN JOINT COMMITTEE ON CANCER 7TH EDITION (N2) (HCC): ICD-10-CM

## 2024-11-15 PROCEDURE — 99214 OFFICE O/P EST MOD 30 MIN: CPT | Performed by: FAMILY MEDICINE

## 2024-11-15 RX ORDER — TRAZODONE HYDROCHLORIDE 50 MG/1
50 TABLET, FILM COATED ORAL NIGHTLY PRN
Qty: 30 TABLET | Refills: 5 | Status: SHIPPED | OUTPATIENT
Start: 2024-11-15

## 2024-11-15 RX ORDER — IBUPROFEN 400 MG/1
400 TABLET, FILM COATED ORAL EVERY 6 HOURS PRN
COMMUNITY

## 2024-11-15 RX ORDER — HYDROCORTISONE ACETATE 0.5 %
CREAM (GRAM) TOPICAL
COMMUNITY
Start: 2024-11-01

## 2024-11-15 RX ORDER — LETROZOLE 2.5 MG/1
2.5 TABLET, FILM COATED ORAL DAILY
COMMUNITY
Start: 2024-09-23

## 2024-11-15 NOTE — PROGRESS NOTES
Avril Bueno is a 64 y.o.  female and presents with    Chief Complaint   Patient presents with    Follow-up    Knee Pain       Subjective:  Knee Pain  Patient presents with a knee injury involving the left knee. Onset of the symptoms was several months ago. Inciting event: injured while turning to andre dog and twisted the knee . Current symptoms include pain located left posterior and right inferior and anterior, stiffness, and swelling. Pain is aggravated by supination and full extension. Patient has had no prior knee problems. Evaluation to date: none. Treatment to date: ice, OTC analgesics which are somewhat effective, and rest.    She has left breast cancer and after 3 surgeries has clear margins; sentinel lymph node biopsy positive; estrogen receptor positive.she is taking letrozole and this causes joint pain    ROS   General ROS: negative for - chills, fatigue, or fever; no weight gain  Psychological ROS: negative for - anxiety or depression  Ophthalmic ROS: positive for - uses glasses  ENT ROS: negative for - headaches, nasal congestion, or sore throat  Endocrine ROS: negative for - polydipsia/polyuria or temperature intolerance  Respiratory ROS: no cough, shortness of breath, or wheezing  Cardiovascular ROS: no chest pain or dyspnea on exertion  Gastrointestinal ROS: no abdominal pain, change in bowel habits, or black or bloody stools  Genito-Urinary ROS: positive for - urge incontinence  Musculoskeletal ROS: positive for - joint pain  Neurological ROS: negative for - numbness/tingling or weakness  Dermatological ROS: positive for - rash associated with poison ivy    All other systems reviewed and are negative.      Objective:  Vitals:    11/15/24 1211   BP: 135/85   Pulse: 95   Temp: 97.8 °F (36.6 °C)   SpO2: 91%         alert, well appearing, and in no distress and oriented to person, place, and time  Mental status - normal mood, behavior, speech, dress, motor activity, and thought

## 2024-11-15 NOTE — PROGRESS NOTES
Avril Bueno is a 64 y.o. year old female who presents today for   Chief Complaint   Patient presents with    Follow-up    Knee Pain        \"Have you been to the ER, urgent care clinic since your last visit?  Hospitalized since your last visit?\"   NO     “Have you seen or consulted any other health care providers outside our system since your last visit?”   NO      “Have you had a pap smear?”    NO    Date of last Cervical Cancer screen (HPV or PAP): 11/14/2012             - AMBER Combs  [facility]  Phone: [phone]  Fax: [phone]

## 2024-11-26 DIAGNOSIS — M25.562 ACUTE PAIN OF LEFT KNEE: Primary | ICD-10-CM

## 2024-12-03 ENCOUNTER — PATIENT MESSAGE (OUTPATIENT)
Facility: CLINIC | Age: 64
End: 2024-12-03

## 2024-12-03 DIAGNOSIS — E78.00 HYPERCHOLESTEREMIA: ICD-10-CM

## 2024-12-03 NOTE — TELEPHONE ENCOUNTER
Medication(s) requesting:   Requested Prescriptions     Pending Prescriptions Disp Refills    rosuvastatin (CRESTOR) 20 MG tablet 90 tablet 3     Sig: Take 1 tablet by mouth daily       Last office visit:  11/15/24  Next office visit DMA: Visit date not found

## 2024-12-05 RX ORDER — ROSUVASTATIN CALCIUM 20 MG/1
20 TABLET, COATED ORAL DAILY
Qty: 90 TABLET | Refills: 3 | Status: SHIPPED | OUTPATIENT
Start: 2024-12-05

## 2024-12-12 ENCOUNTER — HOSPITAL ENCOUNTER (OUTPATIENT)
Facility: HOSPITAL | Age: 64
Setting detail: RECURRING SERIES
Discharge: HOME | End: 2024-12-15
Payer: COMMERCIAL

## 2024-12-12 PROCEDURE — 97110 THERAPEUTIC EXERCISES: CPT

## 2024-12-12 PROCEDURE — 97162 PT EVAL MOD COMPLEX 30 MIN: CPT

## 2024-12-12 PROCEDURE — 97016 VASOPNEUMATIC DEVICE THERAPY: CPT

## 2024-12-12 NOTE — PROGRESS NOTES
PT DAILY TREATMENT NOTE/KNEE EVAL       Patient Name: Avril Bueno    Date: 2024    : 1960  Insurance: Payor: DEIRDRE FLEMING / Plan: [facility] [place] / Product Type: *No Product type* /      Patient  verified yes     Visit #   Current / Total 1 4-12   Time   In / Out 12:38 1:20   Pain   In / Out 310 3/10   Subjective Functional Status/Changes: See below     Treatment Area: Pain in left knee [M25.562]    SUBJECTIVE  Pain Level (0-10 scale): Best: 2/10 Worst: 7/10  []constant []intermittent []improving []worsening []no change since onset    Current symptoms/Complaints: Patient states she twisted her left knee on 10/15/24 while walking. States she has taken NSAIDS and iced for 6 weeks with no relief. States about a week ago she heard an audible  pop on the lateral aspect of her left knee and it has felt better since then. X rays have been taken.     PMH:Arthritis, currently receiving treatment for breast CA, anxiety, high cholesterol, osteopenia     OBJECTIVE/EXAMINATION    Mobility: [x] No assistive device [] RW [] Rollator [] Hemiwalker [] LBQC [] SBQC [] SPC [] Other:   Self Care:     Modalities Rationale:     decrease edema, decrease inflammation, and decrease pain to improve patient's ability to progress to PLOF and address remaining functional goals.     min [] Estim Unattended, type/location:                                      []  w/ice    []  w/heat    min [] Estim Attended, type/location:                                     []  w/US     []  w/ice    []  w/heat    []  TENS insruct      min []  Mechanical Traction: type/lbs                   []  pro   []  sup   []  int   []  cont    []  before manual    []  after manual    min []  Ultrasound, settings/location:      min []  Iontophoresis w/ dexamethasone, location:                                               []  take home patch       []  in clinic    min  unbilled []  Ice     []  Heat    location/position:    10 min []  Vasopneumatic Device, 
function, impaired gait/balance, decrease ADL/functional abilities, decrease activity tolerance, decrease flexibility/joint mobility, and decrease transfer abilities    Treatment Plan may include any combination of the followin Therapeutic Exercise, 15385 Neuromuscular Re-Education, 27465 Manual Therapy, 64224 Therapeutic Activity, 58040 Self Care/Home Management, 76890 Electrical Stim unattended /  (MCR), 43146 Vasopneumatic Device  (Vasopnuematic compression justification:  Per bilateral girth measures taken and listed above the edema is considered significant and having an impact on the patient's strength, balance, gait, transfers, self care, and ADL's), and (Elective Self Pay) Needle Insertion w/o Injection (1 or 2 muscles), (3+ muscles)  Patient / Family readiness to learn indicated by: asking questions, trying to perform skills, interest, return verbalization , and return demonstration   Persons(s) to be included in education: patient (P)  Barriers to Learning/Limitations: none  Measures taken if barriers to learning present: NA  Patient Goal (s): \"less pain or referral for MRI\"  Patient Self Reported Health Status: fair  Rehabilitation Potential: good    Short Term Goals: To be accomplished in 2 weeks   Patient will be compliant with home exercise program (HEP) to promote gains with therapy.  Status at IE: issued HEP    Long Term Goals: To be accomplished in 4 weeks   Patient will be independent with his/her home exercise program (HEP) to maintain gains of therapy.  Status at IE: issued HEP  2.  Patient will score > 55/80 on the Lower Extremity Functional Scale to indicate improved tolerance with functional activities.  Status at IE: 46/80  3.  Patient will demonstrate ability to obtain left knee extension AROM to 0 deg with pain < 1/10 to improve supine positional tolerance.   Status at IE: 0 deg with pain of 4/10  4.  Patient will demonstrate left knee flexion AROM to > 125 deg to improve

## 2024-12-23 ENCOUNTER — HOSPITAL ENCOUNTER (OUTPATIENT)
Facility: HOSPITAL | Age: 64
Setting detail: RECURRING SERIES
Discharge: HOME | End: 2024-12-26
Payer: COMMERCIAL

## 2024-12-23 PROCEDURE — 97016 VASOPNEUMATIC DEVICE THERAPY: CPT

## 2024-12-23 PROCEDURE — 97530 THERAPEUTIC ACTIVITIES: CPT

## 2024-12-23 PROCEDURE — 97112 NEUROMUSCULAR REEDUCATION: CPT

## 2024-12-23 PROCEDURE — 97110 THERAPEUTIC EXERCISES: CPT

## 2024-12-23 NOTE — PROGRESS NOTES
PHYSICAL / OCCUPATIONAL THERAPY - DAILY TREATMENT NOTE    Patient Name: Avril Bueno    Date: 2024    : 1960  Insurance: Payor: DEIRDRE FLEMING / [place]: [facility] [place] / Product Type: *No Product type* /      Patient  verified Yes     Visit #   Current / Total 2 4-12   Time   In / Out 340 430   Pain   In / Out 2/10 /10   Subjective Functional Status/Changes: I am doing my home exercises      TREATMENT AREA =  Pain in left knee [M25.562]     OBJECTIVE    Modalities Rationale:     decrease edema and decrease pain to improve patient's ability to progress to PLOF and address remaining functional goals.     min [] Estim Unattended, type/location:                                      []  w/ice    []  w/heat    min [] Estim Attended, type/location:                                     []  w/US     []  w/ice    []  w/heat    []  TENS insruct      min []  Mechanical Traction: type/lbs                   []  pro   []  sup   []  int   []  cont    []  before manual    []  after manual    min []  Ultrasound, settings/location:      min  unbill []  Ice     []  Heat    location/position:     min []  Paraffin,  details:    10 min []  Vasopneumatic Device, press/temp:  L knee Mod pressure     min []  Whirlpool / Fluido:    If using vaso (only need to measure limb vaso being performed on)      pre-treatment girth : 45cm      post-treatment girth : 44.5cm       measured at (landmark location) :  mid patella    min []  Other:    Skin assessment post-treatment:   Intact      Therapeutic Procedures:    Tx Min Billable or 1:1 Min (if diff from Tx Min) Procedure, Rationale, Specifics    20 89211 Therapeutic Exercise (timed):  increase ROM, strength, coordination, balance, and proprioception to improve patient's ability to progress to PLOF and address remaining functional goals. (see flow sheet as applicable)     Details if applicable:        10 16313 Neuromuscular Re-Education (timed):  improve balance, coordination, kinesthetic

## 2025-01-03 ENCOUNTER — HOSPITAL ENCOUNTER (OUTPATIENT)
Facility: HOSPITAL | Age: 65
Setting detail: RECURRING SERIES
Discharge: HOME | End: 2025-01-06
Payer: COMMERCIAL

## 2025-01-03 PROCEDURE — 97140 MANUAL THERAPY 1/> REGIONS: CPT

## 2025-01-03 PROCEDURE — 97110 THERAPEUTIC EXERCISES: CPT

## 2025-01-03 PROCEDURE — 97112 NEUROMUSCULAR REEDUCATION: CPT

## 2025-01-03 NOTE — PROGRESS NOTES
PHYSICAL / OCCUPATIONAL THERAPY - DAILY TREATMENT NOTE    Patient Name: Avril Bueno    Date: 1/3/2025    : 1960  Insurance: Payor: BCBS / Plan: BCBS OUT OF STATE / Product Type: *No Product type* /      Patient  verified Yes     Visit #   Current / Total 3 4-12   Time   In / Out 9:42 am 10:35 am   Pain   In / Out 3/10 0/10   Subjective Functional Status/Changes: Patient notes some improvement in symptoms.     TREATMENT AREA =  Pain in left knee [M25.562]     OBJECTIVE  Modalities Rationale:     decrease pain to improve patient's ability to progress to PLOF and address remaining functional goals.    10 min  unbill [x]  Heat    location/position: Semi-reclined with legs elevated on wedge to left knee   Skin assessment post-treatment:   Intact        Therapeutic Procedures:  Tx Min Billable or 1:1 Min (if diff from Tx Min) Procedure, Rationale, Specifics    31885 Therapeutic Exercise (timed):  increase ROM, strength, coordination, balance, and proprioception to improve patient's ability to progress to PLOF and address remaining functional goals. (see flow sheet as applicable)     Details if applicable:       10 10 23731 Neuromuscular Re-Education (timed):  improve balance, coordination, kinesthetic sense, posture, core stability and proprioception to improve patient's ability to develop conscious control of individual muscles and awareness of position of extremities in order to progress to PLOF and address remaining functional goals. (see flow sheet as applicable)     Details if applicable:       10 10 16403 Manual Therapy (timed):  decrease pain, increase ROM, and increase tissue extensibility to improve patient's ability to progress to PLOF and address remaining functional goals.  The manual therapy interventions were performed at a separate and distinct time from the therapeutic activities interventions . (see flow sheet as applicable)     Details if applicable: DTM to (L) hamstrings and gracilis;

## 2025-01-09 ENCOUNTER — HOSPITAL ENCOUNTER (OUTPATIENT)
Facility: HOSPITAL | Age: 65
Setting detail: RECURRING SERIES
Discharge: HOME | End: 2025-01-12
Payer: COMMERCIAL

## 2025-01-09 PROCEDURE — 97140 MANUAL THERAPY 1/> REGIONS: CPT

## 2025-01-09 PROCEDURE — 97110 THERAPEUTIC EXERCISES: CPT

## 2025-01-09 PROCEDURE — 97112 NEUROMUSCULAR REEDUCATION: CPT

## 2025-01-09 NOTE — PROGRESS NOTES
[facility]   [address] [place] [address]  Phone: [phone] Fax: [phone]  PROGRESS NOTE  Patient Name: Avril Bueno : 1960   Treatment/Medical Diagnosis: Pain in left knee [M25.562]   Referral Source:  Payor Bakari Calderon MD  Payor: BCBS / Plan: BCBS OUT OF STATE / Product Type: *No Product type* /      Date of Initial Visit: 24 Attended Visits: 4 Missed Visits: 0     SUMMARY OF TREATMENT  Patient is a 64 year old female who presents to physical therapy today with a diagnosis of acute pain of left knee. Patient states she twisted her left knee while walking on 10/15/24. Treatment has consisted of the followin Therapeutic Exercise, 88082 Neuromuscular Re-Education, 92708 Manual Therapy, 94820 Therapeutic Activity, 92485 Self Care/Home Management, 34689 Vasopneumatic Device     CURRENT STATUS  Patient has attended 4 out of 4 sessions from 24-25, making steady progress at this time. Assessment as follows:    Improvements: improved gait quality, prolonged walking, increased knee flexibility, transfers, decreased knee pain  Deficits: rolling in bed, squatting, single limb balance, short jogging as when crossing street or rushing to walk into another room, stairs     Subjective improvement of 60% in symptoms since IE reported.       Gait: normalized  Stairs: step-over-step pattern, decreased eccentric quadriceps control with descending    ROM / Strength                                      AROM          Strength (1-5)      Left Right Left Right   Hip Flexion             Extension     4 4     Abduction     4+ 5     IR     5 5     ER     4 5   Knee Flexion 125 130 4 p!  5     Extension 0  0 5 5         Other tests/comments:  90/90 hamstring test: 32 deg left, 20 deg right             Progress toward goals / Updated goals:       Short Term Goals: To be accomplished in 2 weeks   Patient will be compliant with

## 2025-01-09 NOTE — PROGRESS NOTES
PHYSICAL / OCCUPATIONAL THERAPY - DAILY TREATMENT NOTE    Patient Name: Avril Bueno    Date: 2025    : 1960  Insurance: Payor: BCBS / Plan: BCBS OUT OF STATE / Product Type: *No Product type* /      Patient  verified Yes     Visit #   Current / Total 4 4-12   Time   In / Out 9:00 am 9:56 am   Pain   In / Out 1/10 0/10   Subjective Functional Status/Changes: Patient notes less pain since using the massage gun on her thigh.     TREATMENT AREA =  Pain in left knee [M25.562]     OBJECTIVE  Modalities Rationale:     decrease pain to improve patient's ability to progress to PLOF and address remaining functional goals.    10 min  unbill [x]  Heat    location/position: Semi-reclined with legs elevated on wedge to left knee   Skin assessment post-treatment:   Intact        Therapeutic Procedures:  Tx Min Billable or 1:1 Min (if diff from Tx Min) Procedure, Rationale, Specifics   26 20 16968 Therapeutic Exercise (timed):  increase ROM, strength, coordination, balance, and proprioception to improve patient's ability to progress to PLOF and address remaining functional goals. (see flow sheet as applicable)     Details if applicable:  reassessment     12 12 84278 Neuromuscular Re-Education (timed):  improve balance, coordination, kinesthetic sense, posture, core stability and proprioception to improve patient's ability to develop conscious control of individual muscles and awareness of position of extremities in order to progress to PLOF and address remaining functional goals. (see flow sheet as applicable)     Details if applicable:  reassessment     8 8 17490 Manual Therapy (timed):  decrease pain, increase ROM, and increase tissue extensibility to improve patient's ability to progress to PLOF and address remaining functional goals.  The manual therapy interventions were performed at a separate and distinct time from the therapeutic activities interventions . (see flow sheet as applicable)     Details if

## 2025-01-16 ENCOUNTER — HOSPITAL ENCOUNTER (OUTPATIENT)
Facility: HOSPITAL | Age: 65
Setting detail: RECURRING SERIES
Discharge: HOME | End: 2025-01-19
Payer: COMMERCIAL

## 2025-01-16 PROCEDURE — 97140 MANUAL THERAPY 1/> REGIONS: CPT

## 2025-01-16 PROCEDURE — 97112 NEUROMUSCULAR REEDUCATION: CPT

## 2025-01-16 PROCEDURE — 97110 THERAPEUTIC EXERCISES: CPT

## 2025-01-16 NOTE — PROGRESS NOTES
PHYSICAL / OCCUPATIONAL THERAPY - DAILY TREATMENT NOTE    Patient Name: Avril Bueno    Date: 2025    : 1960  Insurance: Payor: BCBS / Plan: BCBS OUT OF STATE / Product Type: *No Product type* /      Patient  verified Yes     Visit #   Current / Total 1 10   Time   In / Out 9:40 am 10:34 am   Pain   In / Out 1/10 0/10   Subjective Functional Status/Changes: \"I am surprised at how much this has been working.\"     TREATMENT AREA =  Pain in left knee [M25.562]     OBJECTIVE  Modalities Rationale:     decrease pain to improve patient's ability to progress to PLOF and address remaining functional goals.    10 min  unbill    [x]  Heat    location/position: Supine with legs elevated on wedge to left knee   Skin assessment post-treatment:   Intact        Therapeutic Procedures:  Tx Min Billable or 1:1 Min (if diff from Tx Min) Procedure, Rationale, Specifics   22 16 65434 Therapeutic Exercise (timed):  increase ROM, strength, coordination, balance, and proprioception to improve patient's ability to progress to PLOF and address remaining functional goals. (see flow sheet as applicable)     Details if applicable:  stretching, strengthening     12 12 32256 Neuromuscular Re-Education (timed):  improve balance, coordination, kinesthetic sense, posture, core stability and proprioception to improve patient's ability to develop conscious control of individual muscles and awareness of position of extremities in order to progress to PLOF and address remaining functional goals. (see flow sheet as applicable)     Details if applicable: gluteal re-education     10 10 99321 Manual Therapy (timed):  decrease pain, increase ROM, and increase tissue extensibility to improve patient's ability to progress to PLOF and address remaining functional goals.  The manual therapy interventions were performed at a separate and distinct time from the therapeutic activities interventions . (see flow sheet as applicable)     Details if

## 2025-01-22 ENCOUNTER — HOSPITAL ENCOUNTER (OUTPATIENT)
Facility: HOSPITAL | Age: 65
Setting detail: RECURRING SERIES
Discharge: HOME | End: 2025-01-25
Payer: COMMERCIAL

## 2025-01-22 PROCEDURE — 97112 NEUROMUSCULAR REEDUCATION: CPT

## 2025-01-22 PROCEDURE — 97530 THERAPEUTIC ACTIVITIES: CPT

## 2025-01-22 PROCEDURE — 97110 THERAPEUTIC EXERCISES: CPT

## 2025-01-22 NOTE — PROGRESS NOTES
concurrently with other procedures   [x] Review HEP    [] Progressed/Changed HEP, detail:    [] Other detail:       Objective Information/Functional Measures/Assessment    -Mini squats initially painful. Pt better able to complete minisquats after initiating with BOSU lunges and TKE with band. She responds well to verbal cues for \"sitting down to chair\" to initiate posterior hip hinge.  -Updated HEP to consolidated list of open/closed chain interventions to address deficits as pt will discharge at next session.    Patient will continue to benefit from skilled PT / OT services to modify and progress therapeutic interventions, analyze and address functional mobility deficits, analyze and address ROM deficits, analyze and address strength deficits, analyze and address soft tissue restrictions, analyze and cue for proper movement patterns, analyze and modify for postural abnormalities, analyze and address imbalance/dizziness, and instruct in home and community integration to address functional deficits and attain remaining goals.    Progress toward goals / Updated goals:  []  See Progress Note/Recertification    - Patient will score > 55/80 on the Lower Extremity Functional Scale to indicate improved tolerance with functional activities.  Status at last assessment: 51/80  - Patient will demonstrate ability to obtain left knee extension AROM to 0 deg with pain < 1/10 to improve supine positional tolerance.   Status at last assessment: 0 deg with pain during TKE at 3/10  Current: goal met; 0 deg without pain (1/16/25 - JQ, PTA)  - Patient will increased hamstring strength to >/= 4+/5 to improve strength for short jogging and/or quick walking as necessitated when crossing a street or moving quickly between rooms.  Status at last assessment: 4/5  Current: progressing, addressing with increased resistance of standing hamstring curls (1/22/25)        Next PN/ RC due 2/10/2025  Auth due (visit number/ date) 30

## 2025-01-30 ENCOUNTER — APPOINTMENT (OUTPATIENT)
Facility: HOSPITAL | Age: 65
End: 2025-01-30
Payer: COMMERCIAL

## 2025-01-31 ENCOUNTER — HOSPITAL ENCOUNTER (OUTPATIENT)
Facility: HOSPITAL | Age: 65
Setting detail: RECURRING SERIES
End: 2025-01-31
Payer: COMMERCIAL

## 2025-01-31 PROCEDURE — 97140 MANUAL THERAPY 1/> REGIONS: CPT

## 2025-01-31 PROCEDURE — 97110 THERAPEUTIC EXERCISES: CPT

## 2025-01-31 PROCEDURE — 97530 THERAPEUTIC ACTIVITIES: CPT

## 2025-01-31 NOTE — PROGRESS NOTES
PHYSICAL / OCCUPATIONAL THERAPY - DAILY TREATMENT NOTE    Patient Name: Avril Bueno    Date: 2025    : 1960  Insurance: Payor: BCBS / Plan: BCBS OUT OF STATE / Product Type: *No Product type* /      Patient  verified Yes     Visit #   Current / Total 3 8   Time   In / Out 9:00 am  9:46 am   Pain   In / Out /10 0/10   Subjective Functional Status/Changes: Patient notes return to baseline symptoms.     TREATMENT AREA =  Pain in left knee [M25.562]     OBJECTIVE      Therapeutic Procedures:  Tx Min Billable or 1:1 Min (if diff from Tx Min) Procedure, Rationale, Specifics   24 17 46531 Therapeutic Exercise (timed):  increase ROM, strength, coordination, balance, and proprioception to improve patient's ability to progress to PLOF and address remaining functional goals. (see flow sheet as applicable)     Details if applicable:  strengthening, reassessment     12 12 71594 Therapeutic Activity (timed):  use of dynamic activities replicating functional movements to increase ROM, strength, coordination, balance, and proprioception in order to improve patient's ability to progress to PLOF and address remaining functional goals.  (see flow sheet as applicable)     Details if applicable:     10 10 89238 Manual Therapy (timed):  decrease pain, increase ROM, and increase tissue extensibility to improve patient's ability to progress to PLOF and address remaining functional goals.  The manual therapy interventions were performed at a separate and distinct time from the therapeutic activities interventions . (see flow sheet as applicable)     Details if applicable:  STM to (L) calf and hamstring, ankle DF stretch       46 39 MC BC Totals Reminder: bill using total billable min of TIMED therapeutic procedures (example: do not include dry needle or estim unattended, both untimed codes, in totals to left)  8-22 min = 1 unit; 23-37 min = 2 units; 38-52 min = 3 units; 53-67 min = 4 units; 68-82 min = 5 units   Total  Total     [x]  Patient Education billed concurrently with other procedures   [x] Review HEP    [] Progressed/Changed HEP, detail:    [] Other detail:         Objective Information/Functional Measures/Assessment    Gait: normalized  Stairs: step-over-step pattern, decreased eccentric quadriceps control with descending     ROM / Strength                                      AROM          Strength (1-5)      Left Right Left Right   Hip Flexion             Extension     4+ 4+     Abduction     5 5     IR     5 5     ER     4+ 5   Knee Flexion 125 130 5  5     Extension 0  0 5 5              Patient will continue to benefit from skilled PT / OT services to modify and progress therapeutic interventions, analyze and address functional mobility deficits, analyze and address ROM deficits, analyze and address strength deficits, analyze and address soft tissue restrictions, analyze and cue for proper movement patterns, analyze and modify for postural abnormalities, analyze and address imbalance/dizziness, and instruct in home and community integration to address functional deficits and attain remaining goals.     Progress toward goals / Updated goals:  [x]  See Progress Note/Recertification  - Patient will score > 55/80 on the Lower Extremity Functional Scale to indicate improved tolerance with functional activities.  Status at last assessment: 51/80  Current: goal met; 67/80  - Patient will demonstrate ability to obtain left knee extension AROM to 0 deg with pain < 1/10 to improve supine positional tolerance.   Status at last assessment: 0 deg with pain during TKE at 3/10  Current: goal met; 0 deg without pain after MT  - Patient will increased hamstring strength to >/= 4+/5 to improve strength for short jogging and/or quick walking as necessitated when crossing a street or moving quickly between rooms.  Status at last assessment: 4/5  Current: goal met; 5/5      Next PN/ RC due 2/10/2025  Auth due (visit number/ date) 30

## 2025-01-31 NOTE — PROGRESS NOTES
Physical Therapy Discharge Instructions      [facility]   [address] [address] [phone] fax      Patient: Avril Bueno  : 1960      Continue Home Exercise Program 1 times per day, 3-4 times per week:      Continue with    [x] Ice  as needed                Follow up with MD:   [x] As needed      Recommendations:     [x]   Return to activity with home program            Archie Kim PTA 2025 9:45 AM

## 2025-02-06 NOTE — PROGRESS NOTES
[facility]   [address] [address]  Phone: [phone] Fax [phone]  DISCHARGE SUMMARY  Patient Name: Avril Bueno : 1960   Treatment/Medical Diagnosis: Pain in left knee [M25.562]   Referral Source: Bakari Calderon MD     Date of Initial Visit: 24 Attended Visits: 7 Missed Visits: 0     SUMMARY OF TREATMENT  Patient is a 64 year old female who presents to physical therapy today with a diagnosis of acute pain of left knee. Patient states she twisted her left knee while walking on 10/15/24. Treatment has consisted of the followin Therapeutic Exercise, 59409 Neuromuscular Re-Education, 23665 Manual Therapy, 10799 Therapeutic Activity, 17739 Self Care/Home Management, 96583 Vasopneumatic Device     CURRENT STATUS  Patient has attended 7 out of 7 sessions from 24-25, making great progress in PT. The patient demonstrates increased knee extension AROM, likely contributing to overall decrease in pain with ADLs. Patient will be leaving out of town for vacation for an extended period of time and feels she has made appropriate progress with strength gains as to discharge to a comprehensive home exercise program while on vacation. Assessment as follows:      Gait: normalized  Stairs: step-over-step pattern, decreased eccentric quadriceps control with descending     ROM / Strength                                      AROM          Strength (1-5)      Left Right Left Right   Hip Flexion             Extension     4+ 4+     Abduction     5 5     IR     5 5     ER     4+ 5   Knee Flexion 125 130 5  5     Extension 0  0 5 5         Progress toward goals / Updated goals:   - Patient will score > 55/80 on the Lower Extremity Functional Scale to indicate improved tolerance with functional activities.  Status at last assessment:   Current: goal met;   - Patient will demonstrate ability to obtain left knee

## 2025-03-18 DIAGNOSIS — E78.00 HYPERCHOLESTEREMIA: ICD-10-CM

## 2025-03-19 RX ORDER — ROSUVASTATIN CALCIUM 20 MG/1
20 TABLET, COATED ORAL DAILY
Qty: 90 TABLET | Refills: 3 | Status: SHIPPED | OUTPATIENT
Start: 2025-03-19